# Patient Record
(demographics unavailable — no encounter records)

---

## 2024-11-22 NOTE — ASSESSMENT
[FreeTextEntry1] : Annual SBIRT negative Depression screen negative Check comprehensive labs, in office today  Vaccines  Adacel 2021 Pneumovax 2012 Prevnar 13 2015 Shingrix 2021 Flu 9/2024 COVID booster declines  EKG @ cardiology 9/24  Mammogram 1/2024 Colonoscopy 2017 MME 29/30  HTN Was on Metoprolol by cardiologist She did not like how she felt on the drug, was discontinued. States Home readings are slightly high Wants to wait to adjust medications till she sees Dr Ford in a few weeks. Has gained weight. On Prednisone 3 mg. Weight loss/ low salt diet. Exercise as tolerated. On Valsartan 160 mg and Dyazide. low salt/ reg exercise/ weight loss  Anxiety Lexapro 10 mg. States she is doing well. requesting renewal of Alprazolam, last Rx 7/23.  checked Rx renewed.  Iron deficiency: Seen by Hematologist Was Advised to stay on Aspirin. Cardiologist was okay with coming off Aspirin. Iron sat slightly low. Currently not on supplements  Obesity/pre diabetes does not want medication. Working on weight loss. She is on Prednisone 4 mg. A1C 6.1 Doing chair yoga  Low back pain improved  she sees a chiropractor  x-ray done there was told she has pseudogout.  Foot pain after fall Chiropractor/ cold therapy helps.  PMR: Rheumatologist Dr Olmos on prednisone 4 mg.  Intermittent palpitations resolved seen by Dr Ford and Dr Armendariz Suggested medications not keen. Rare symptoms States she did not like how she felt on b blockers.  HLD on Rosuvastatin. Follow up in 3 months or when she needs Xanax rx renewed.

## 2024-11-22 NOTE — HEALTH RISK ASSESSMENT
[Good] : ~his/her~  mood as  good [Yes] : Yes [Monthly or less (1 pt)] : Monthly or less (1 point) [1 or 2 (0 pts)] : 1 or 2 (0 points) [Never (0 pts)] : Never (0 points) [0] : 2) Feeling down, depressed, or hopeless: Not at all (0) [PHQ-2 Negative - No further assessment needed] : PHQ-2 Negative - No further assessment needed [Former] : Former [> 15 Years] : > 15 Years [Patient reported mammogram was normal] : Patient reported mammogram was normal [Patient reported colonoscopy was normal] : Patient reported colonoscopy was normal [With Family] : lives with family [] :  [Feels Safe at Home] : Feels safe at home [Fully functional (bathing, dressing, toileting, transferring, walking, feeding)] : Fully functional (bathing, dressing, toileting, transferring, walking, feeding) [Fully functional (using the telephone, shopping, preparing meals, housekeeping, doing laundry, using] : Fully functional and needs no help or supervision to perform IADLs (using the telephone, shopping, preparing meals, housekeeping, doing laundry, using transportation, managing medications and managing finances) [Reports changes in hearing] : Reports changes in hearing [Smoke Detector] : smoke detector [Carbon Monoxide Detector] : carbon monoxide detector [Seat Belt] :  uses seat belt [Sunscreen] : uses sunscreen [Reviewed no changes] : Reviewed, no changes [Designated Healthcare Proxy] : Designated healthcare proxy [Relationship: ___] : Relationship: [unfilled] [No] : In the past 12 months have you used drugs other than those required for medical reasons? No [Little interest or pleasure doing things] : 1) Little interest or pleasure doing things [Feeling down, depressed, or hopeless] : 2) Feeling down, depressed, or hopeless [10-14] : 10-14 [NO] : No [Patient reported bone density results were abnormal] : Patient reported bone density results were abnormal [One fall no injury in past year] : Patient reported one fall in the past year without injury [Audit-CScore] : 1 [KGZ2Kaflk] : 0 [de-identified] : 1 ppd for 10-12, quit 1972 [Change in mental status noted] : No change in mental status noted [Reports changes in vision] : Reports no changes in vision [Reports changes in dental health] : Reports no changes in dental health [TB Exposure] : is not being exposed to tuberculosis [MammogramDate] : 1/2024 [BoneDensityDate] : 1/2023 [BoneDensityComments] : Osteopenia [ColonoscopyDate] : 11/2015 [de-identified] : had it tested, did not get hearing aide. Will get it after cardiac w/u [AdvancecareDate] : 11/2024

## 2024-11-22 NOTE — HISTORY OF PRESENT ILLNESS
[FreeTextEntry1] : Annual [de-identified] : Ms. HAYDEN CUEVAS is a 76 year old female presenting for an annual. Doing well, gaining weight. 6/2024 Fell while playing [pickleball 1/24. No injuries. Went to Chiropractor. Cold treatments helped. BP was high when she saw the cardiologist 12/23 Keeping log 128-158/71-86.  w/ history of HTN, Pre diabetes, HLD, and anxiety On 4 mg of prednisone  gained a little weight, states she is 188 at home. She reduced Lexapro to 5 mg thinking that causes weight gain, did not do well.  Went back on the higher dose.  Seen by Dr Avina Hematologist Iron was low normal 39. Ferritin okay Iron sat slightly low. Infusion suggested patient not keen will try orals. Currently not on supplements Patient states that she has low back pain for several months now.  Increase with activity.  She states she wants an x-ray done.  Pain sometimes radiates down her leg.  No weakness.  No numbness tingling.  lost weight 4 lbs.  doing Optiva. She reduced her dose of Escitalopram to 5 mg On Prednisone 5 mg  prev Hx:  States with the booster she had a pseudogout flare.  States she was admitted in [place]  for palpitations. Echo done there was okay according to patient. Seen by Dr Allison 3/24, scheduled to see EPS specialist. Possible ablation She keeps log of Bp and pulse She thinks Lexapro made her gain weight. She weaned herself off . She discontinued 11/20. weaned off. Stressed with husbands health. pre diabetes does not want medication. Working on weight loss. Injections not covered. Very expensive. Saw Dr Olmos  on 5 mg of prednisone CRP 6 recent labs. Intermittent palpitations seen by DR Ford and Dr Armendariz Suggested medications not keen. Rare symptoms HTN Was put on  Metoprolol by cardiologist She did not like how she felt on the drug, was discontinued. States Home readings are 140/80. HLD on Rosuvastatin.

## 2024-11-22 NOTE — HEALTH RISK ASSESSMENT
[Good] : ~his/her~  mood as  good [Yes] : Yes [Monthly or less (1 pt)] : Monthly or less (1 point) [1 or 2 (0 pts)] : 1 or 2 (0 points) [Never (0 pts)] : Never (0 points) [0] : 2) Feeling down, depressed, or hopeless: Not at all (0) [PHQ-2 Negative - No further assessment needed] : PHQ-2 Negative - No further assessment needed [Former] : Former [> 15 Years] : > 15 Years [Patient reported mammogram was normal] : Patient reported mammogram was normal [Patient reported colonoscopy was normal] : Patient reported colonoscopy was normal [With Family] : lives with family [] :  [Feels Safe at Home] : Feels safe at home [Fully functional (bathing, dressing, toileting, transferring, walking, feeding)] : Fully functional (bathing, dressing, toileting, transferring, walking, feeding) [Fully functional (using the telephone, shopping, preparing meals, housekeeping, doing laundry, using] : Fully functional and needs no help or supervision to perform IADLs (using the telephone, shopping, preparing meals, housekeeping, doing laundry, using transportation, managing medications and managing finances) [Reports changes in hearing] : Reports changes in hearing [Smoke Detector] : smoke detector [Carbon Monoxide Detector] : carbon monoxide detector [Seat Belt] :  uses seat belt [Sunscreen] : uses sunscreen [Reviewed no changes] : Reviewed, no changes [Designated Healthcare Proxy] : Designated healthcare proxy [Relationship: ___] : Relationship: [unfilled] [No] : In the past 12 months have you used drugs other than those required for medical reasons? No [Little interest or pleasure doing things] : 1) Little interest or pleasure doing things [Feeling down, depressed, or hopeless] : 2) Feeling down, depressed, or hopeless [10-14] : 10-14 [NO] : No [Patient reported bone density results were abnormal] : Patient reported bone density results were abnormal [One fall no injury in past year] : Patient reported one fall in the past year without injury [Audit-CScore] : 1 [[id number]] : 0 [de-identified] : 1 ppd for 10-12, quit 1972 [Change in mental status noted] : No change in mental status noted [Reports changes in vision] : Reports no changes in vision [Reports changes in dental health] : Reports no changes in dental health [TB Exposure] : is not being exposed to tuberculosis [MammogramDate] : 1/2024 [BoneDensityDate] : 1/2023 [BoneDensityComments] : Osteopenia [ColonoscopyDate] : 11/2015 [de-identified] : had it tested, did not get hearing aide. Will get it after cardiac w/u [AdvancecareDate] : 11/2024

## 2024-11-22 NOTE — PHYSICAL EXAM
[Normal Sclera/Conjunctiva] : normal sclera/conjunctiva [Supple] : supple [Soft] : abdomen soft [Non Tender] : non-tender [Non-distended] : non-distended [No Masses] : no abdominal mass palpated [Normal Bowel Sounds] : normal bowel sounds [No CVA Tenderness] : no CVA  tenderness [Normal] : affect was normal and insight and judgment were intact

## 2024-11-22 NOTE — HISTORY OF PRESENT ILLNESS
[FreeTextEntry1] : Annual [de-identified] : Ms. HAYDEN CUEVAS is a 76 year old female presenting for an annual. Doing well, gaining weight. 6/2024 Fell while playing [pickleball 1/24. No injuries. Went to Chiropractor. Cold treatments helped. BP was high when she saw the cardiologist 12/23 Keeping log 128-158/71-86.  w/ history of HTN, Pre diabetes, HLD, and anxiety On 4 mg of prednisone  gained a little weight, states she is 188 at home. She reduced Lexapro to 5 mg thinking that causes weight gain, did not do well.  Went back on the higher dose.  Seen by Dr Avina Hematologist Iron was low normal 39. Ferritin okay Iron sat slightly low. Infusion suggested patient not keen will try orals. Currently not on supplements Patient states that she has low back pain for several months now.  Increase with activity.  She states she wants an x-ray done.  Pain sometimes radiates down her leg.  No weakness.  No numbness tingling.  lost weight 4 lbs.  doing Optiva. She reduced her dose of Escitalopram to 5 mg On Prednisone 5 mg  prev Hx:  States with the booster she had a pseudogout flare.  States she was admitted in [place]  for palpitations. Echo done there was okay according to patient. Seen by Dr Allison 3/24, scheduled to see EPS specialist. Possible ablation She keeps log of Bp and pulse She thinks Lexapro made her gain weight. She weaned herself off . She discontinued 11/20. weaned off. Stressed with husbands health. pre diabetes does not want medication. Working on weight loss. Injections not covered. Very expensive. Saw Dr Olmos  on 5 mg of prednisone CRP 6 recent labs. Intermittent palpitations seen by DR Ford and Dr Armendariz Suggested medications not keen. Rare symptoms HTN Was put on  Metoprolol by cardiologist She did not like how she felt on the drug, was discontinued. States Home readings are 140/80. HLD on Rosuvastatin.

## 2024-11-22 NOTE — REVIEW OF SYSTEMS
[Joint Pain] : joint pain [Insomnia] : insomnia [Anxiety] : anxiety [Negative] : Heme/Lymph [Dysuria] : no dysuria [Hematuria] : no hematuria [Vaginal Discharge] : no vaginal discharge [Joint Stiffness] : no joint stiffness [Joint Swelling] : no joint swelling [Muscle Weakness] : no muscle weakness [Muscle Pain] : no muscle pain [Back Pain] : no back pain [Suicidal] : not suicidal [Depression] : no depression

## 2025-06-03 NOTE — ASSESSMENT
[FreeTextEntry1] : Vaccines  Adacel 2021 Pneumovax 2012 Prevnar 13 2015 Shingrix 2021 Flu 9/2024 COVID booster declines  Glaucoma seeing Dr. Maharaj Call to get a copy of the consult.  She states she is on some drops does not remember the name.  HTN Was on Metoprolol by cardiologist She did not like how she felt on the drug, was discontinued. States Home readings are slightly high Wants to wait to adjust medications till she sees Dr Ford in a few weeks. Has gained weight. On Prednisone 3 mg. Weight loss/ low salt diet. Exercise as tolerated. On Valsartan 160 mg and Dyazide. low salt/ reg exercise/ weight loss  Anxiety Lexapro 10 mg. Wants to reduce to 5 mg.  She states that she has dampening of emotions which she does not like. requesting renewal of Alprazolam, last Rx 12/24.  checked Rx renewed.  Iron deficiency: Seen by Hematologist Was Advised to stay on Aspirin. Cardiologist was okay with coming off Aspirin. Iron sat slightly low. Currently not on supplements  Obesity/pre diabetes does not want medication. Working on weight loss. She is on Prednisone 2.5 mg. Dose reduced by rheumatologist 2 months ago. A1C 6.1 Doing chair yoga  Arthralgia/OA: Rheumatologist Dr Colon Seen in April and now on methotrexate 2.5 mg Called to get a copy of consult She states she has a follow-up appointment soon  Intermittent palpitations resolved seen by Dr Ford and Dr Armendariz Suggested medications not keen. Rare symptoms States she did not like how she felt on b blockers.  HLD on Rosuvastatin 10 mg p.o. at bedtime. Labs reviewed with patient LDL 84 Total cholesterol 193. Cholesterol numbers have gone up slightly.  Follow up annual in November Rx for labs given

## 2025-06-03 NOTE — HISTORY OF PRESENT ILLNESS
[de-identified] : Ms. HAYDEN CUEVAS is a 76 year old female presenting for a follow up. She states she saw the rheumatologist 2 months ago she started her on methotrexate and reduced her prednisone to 2.5 mg.  She states that she was given a new diagnosis for her arthritis she does not remember the name.  She states she did better with the 5 mg of the prednisone.  And she will discuss this with the rheumatologist as an upcoming appointment. She states she wants to reduce her citalopram dose to 5 mg.  She states that she lost a friend and did not feel enough emotion at home loss.  She would like another prescription of alprazolam.  She states she uses it infrequently.  Last prescription was filled about 6 months ago. Doing well, gaining weight. 6/2024 Fell while playing [pickleball 1/24. No injuries. Went to Chiropractor. Cold treatments helped. BP was high when she saw the cardiologist 12/23 Keeping log 128-158/71-86.  w/ history of HTN, Pre diabetes, HLD, and anxiety On 4 mg of prednisone  gained a little weight, states she is 188 at home. She reduced Lexapro to 5 mg thinking that causes weight gain, did not do well.  Went back on the higher dose.  Seen by Dr Avina Hematologist Iron was low normal 39. Ferritin okay Iron sat slightly low. Infusion suggested patient not keen will try orals. Currently not on supplements Patient states that she has low back pain for several months now.  Increase with activity.  She states she wants an x-ray done.  Pain sometimes radiates down her leg.  No weakness.  No numbness tingling.  lost weight 4 lbs.  doing Optiva. She reduced her dose of Escitalopram to 5 mg On Prednisone 5 mg  prev Hx:  States with the booster she had a pseudogout flare.  States she was admitted in [place]  for palpitations. Echo done there was okay according to patient. Seen by Dr Allison 3/24, scheduled to see EPS specialist. Possible ablation She keeps log of Bp and pulse She thinks Lexapro made her gain weight. She weaned herself off . She discontinued 11/20. weaned off. Stressed with husbands health. pre diabetes does not want medication. Working on weight loss. Injections not covered. Very expensive. Saw Dr Olmos  on 5 mg of prednisone CRP 6 recent labs. Intermittent palpitations seen by DR Ford and Dr Armendariz Suggested medications not keen. Rare symptoms HTN Was put on  Metoprolol by cardiologist She did not like how she felt on the drug, was discontinued. States Home readings are 140/80. HLD on Rosuvastatin.